# Patient Record
(demographics unavailable — no encounter records)

---

## 2025-02-23 NOTE — PHYSICAL EXAM
[Well Developed] : well developed [Well Nourished] : well nourished [No Acute Distress] : no acute distress [Normal Conjunctiva] : normal conjunctiva [Normal Venous Pressure] : normal venous pressure [Normal S1, S2] : normal S1, S2 [No Rub] : no rub [No Gallop] : no gallop [Clear Lung Fields] : clear lung fields [Good Air Entry] : good air entry [No Respiratory Distress] : no respiratory distress  [Soft] : abdomen soft [Non Tender] : non-tender [No Masses/organomegaly] : no masses/organomegaly [Normal Bowel Sounds] : normal bowel sounds [Normal Gait] : normal gait [No Cyanosis] : no cyanosis [No Clubbing] : no clubbing [No Varicosities] : no varicosities [Normal DP B/L] : normal DP B/L [Edema ___] : edema [unfilled] [Venous stasis] : venous stasis [No Rash] : no rash [No Skin Lesions] : no skin lesions [Moves all extremities] : moves all extremities [No Focal Deficits] : no focal deficits [Normal Speech] : normal speech [Alert and Oriented] : alert and oriented [Normal memory] : normal memory [de-identified] : Right [de-identified] : 2/6 systolic ejection murmur

## 2025-02-23 NOTE — HISTORY OF PRESENT ILLNESS
[FreeTextEntry1] : On December 11 the patient had a left lateral mild chest discomfort which radiated to his upper teeth.  It occurred at rest a few hours after exercise.  He took 3 nitroglycerin.  Pain was a 1-2 out of 10.  Patient is also had heartburn which is a bitter taste in his mouth.  He does calisthenics and walks for 2 hours without difficulty.  He has dyspnea rushing up to help.  He will get lab test tomorrow.

## 2025-02-23 NOTE — DISCUSSION/SUMMARY
[FreeTextEntry1] : The patient has a history of coronary artery disease.  3 months ago he had an episode of chest pain which was atypical/nonanginal.  His EKG is normal.  His exercise tolerance is excellent.  He has not had a recent recurrence.  His last stress test was normal.  The pain was noncardiac.  The blood pressure and cholesterol have been good.  The patient will continue rosuvastatin.  The patient had questions regarding an angiogram but this is not indicated.  He will return for carotid Doppler to evaluate his carotid bruit. [EKG obtained to assist in diagnosis and management of assessed problem(s)] : EKG obtained to assist in diagnosis and management of assessed problem(s)

## 2025-05-11 NOTE — DISCUSSION/SUMMARY
[FreeTextEntry1] : The patient has a history of coronary artery disease.  His atypical chest discomfort.  EKG is normal.  Echocardiogram shows an ejection fraction of 60% without significant valvular disease.  Labs were done recently.  The patient will return for an exercise test.  He will go to the emergency room if he has recurrent pain. [EKG obtained to assist in diagnosis and management of assessed problem(s)] : EKG obtained to assist in diagnosis and management of assessed problem(s)

## 2025-05-11 NOTE — PHYSICAL EXAM
[Well Developed] : well developed [Well Nourished] : well nourished [No Acute Distress] : no acute distress [Normal Conjunctiva] : normal conjunctiva [Normal Venous Pressure] : normal venous pressure [Normal S1, S2] : normal S1, S2 [No Rub] : no rub [No Gallop] : no gallop [Clear Lung Fields] : clear lung fields [Good Air Entry] : good air entry [No Respiratory Distress] : no respiratory distress  [Soft] : abdomen soft [Non Tender] : non-tender [No Masses/organomegaly] : no masses/organomegaly [Normal Bowel Sounds] : normal bowel sounds [Normal Gait] : normal gait [No Cyanosis] : no cyanosis [No Clubbing] : no clubbing [No Varicosities] : no varicosities [Normal DP B/L] : normal DP B/L [Edema ___] : edema [unfilled] [Venous stasis] : venous stasis [No Rash] : no rash [No Skin Lesions] : no skin lesions [Moves all extremities] : moves all extremities [No Focal Deficits] : no focal deficits [Normal Speech] : normal speech [Alert and Oriented] : alert and oriented [Normal memory] : normal memory [de-identified] : Right [de-identified] : 2/6 systolic ejection murmur

## 2025-05-11 NOTE — DISCUSSION/SUMMARY
[EKG obtained to assist in diagnosis and management of assessed problem(s)] : EKG obtained to assist in diagnosis and management of assessed problem(s) [FreeTextEntry1] : The patient has a history of coronary artery disease.  His atypical chest discomfort.  EKG is normal.  Echocardiogram shows an ejection fraction of 60% without significant valvular disease.  Labs were done recently.  The patient will return for an exercise test.  He will go to the emergency room if he has recurrent pain.

## 2025-05-11 NOTE — PHYSICAL EXAM
[Well Developed] : well developed [Well Nourished] : well nourished [No Acute Distress] : no acute distress [Normal Conjunctiva] : normal conjunctiva [Normal Venous Pressure] : normal venous pressure [Normal S1, S2] : normal S1, S2 [No Rub] : no rub [No Gallop] : no gallop [Clear Lung Fields] : clear lung fields [Good Air Entry] : good air entry [No Respiratory Distress] : no respiratory distress  [Soft] : abdomen soft [Non Tender] : non-tender [No Masses/organomegaly] : no masses/organomegaly [Normal Bowel Sounds] : normal bowel sounds [Normal Gait] : normal gait [No Cyanosis] : no cyanosis [No Clubbing] : no clubbing [No Varicosities] : no varicosities [Normal DP B/L] : normal DP B/L [Edema ___] : edema [unfilled] [Venous stasis] : venous stasis [No Rash] : no rash [No Skin Lesions] : no skin lesions [Moves all extremities] : moves all extremities [No Focal Deficits] : no focal deficits [Normal Speech] : normal speech [Alert and Oriented] : alert and oriented [Normal memory] : normal memory [de-identified] : Right [de-identified] : 2/6 systolic ejection murmur

## 2025-05-11 NOTE — HISTORY OF PRESENT ILLNESS
[FreeTextEntry1] : chest pain last 5 days ago, circling, moderate, 10 min, at rest, 3-4x, usual pain. 3-4/10.  walk 40 min, stairs ok.

## 2025-05-11 NOTE — PHYSICAL EXAM
[Well Developed] : well developed [Well Nourished] : well nourished [No Acute Distress] : no acute distress [Normal Conjunctiva] : normal conjunctiva [Normal Venous Pressure] : normal venous pressure [Normal S1, S2] : normal S1, S2 [No Rub] : no rub [No Gallop] : no gallop [Clear Lung Fields] : clear lung fields [Good Air Entry] : good air entry [No Respiratory Distress] : no respiratory distress  [Soft] : abdomen soft [Non Tender] : non-tender [No Masses/organomegaly] : no masses/organomegaly [Normal Bowel Sounds] : normal bowel sounds [Normal Gait] : normal gait [No Cyanosis] : no cyanosis [No Clubbing] : no clubbing [No Varicosities] : no varicosities [Normal DP B/L] : normal DP B/L [Edema ___] : edema [unfilled] [Venous stasis] : venous stasis [No Rash] : no rash [No Skin Lesions] : no skin lesions [Moves all extremities] : moves all extremities [No Focal Deficits] : no focal deficits [Normal Speech] : normal speech [Alert and Oriented] : alert and oriented [Normal memory] : normal memory [de-identified] : Right [de-identified] : 2/6 systolic ejection murmur

## 2025-06-03 NOTE — PHYSICAL EXAM
[Well Developed] : well developed [Well Nourished] : well nourished [No Acute Distress] : no acute distress [Normal Conjunctiva] : normal conjunctiva [Normal Venous Pressure] : normal venous pressure [Normal S1, S2] : normal S1, S2 [No Rub] : no rub [No Gallop] : no gallop [Clear Lung Fields] : clear lung fields [Good Air Entry] : good air entry [No Respiratory Distress] : no respiratory distress  [Soft] : abdomen soft [Non Tender] : non-tender [No Masses/organomegaly] : no masses/organomegaly [Normal Bowel Sounds] : normal bowel sounds [Normal Gait] : normal gait [No Cyanosis] : no cyanosis [No Clubbing] : no clubbing [No Varicosities] : no varicosities [Normal DP B/L] : normal DP B/L [Edema ___] : edema [unfilled] [Venous stasis] : venous stasis [No Rash] : no rash [No Skin Lesions] : no skin lesions [Moves all extremities] : moves all extremities [No Focal Deficits] : no focal deficits [Normal Speech] : normal speech [Alert and Oriented] : alert and oriented [Normal memory] : normal memory [de-identified] : Right [de-identified] : 2/6 systolic ejection murmur

## 2025-06-03 NOTE — PHYSICAL EXAM
[Well Developed] : well developed [Well Nourished] : well nourished [No Acute Distress] : no acute distress [Normal Conjunctiva] : normal conjunctiva [Normal Venous Pressure] : normal venous pressure [Normal S1, S2] : normal S1, S2 [No Rub] : no rub [No Gallop] : no gallop [Clear Lung Fields] : clear lung fields [Good Air Entry] : good air entry [No Respiratory Distress] : no respiratory distress  [Soft] : abdomen soft [Non Tender] : non-tender [No Masses/organomegaly] : no masses/organomegaly [Normal Bowel Sounds] : normal bowel sounds [Normal Gait] : normal gait [No Cyanosis] : no cyanosis [No Clubbing] : no clubbing [No Varicosities] : no varicosities [Normal DP B/L] : normal DP B/L [Edema ___] : edema [unfilled] [Venous stasis] : venous stasis [No Rash] : no rash [No Skin Lesions] : no skin lesions [Moves all extremities] : moves all extremities [No Focal Deficits] : no focal deficits [Normal Speech] : normal speech [Alert and Oriented] : alert and oriented [Normal memory] : normal memory [de-identified] : Right [de-identified] : 2/6 systolic ejection murmur

## 2025-06-03 NOTE — DISCUSSION/SUMMARY
[FreeTextEntry1] : 88 year old Male with PMHx of CAD, HLD, ischemic cardiomyopathy, carotid bruit, CKD, LVH presents today for followup, carotid ultrasound, and stress echocardiogram  Chest pain / CAD / HLD: Chest pain now fully resolved. Stress echocardiogram performed in office today, results pending. Will continue with cardiac risk factor optimization. LDL goal < 70, most recent LDL 33. Labs were completed with his PCP, will reach out to obtain those results. Continue ASA 81 mg daily and Rosuvastatin 20 mg daily. He will continue to monitor his symptoms and notify our office if he has new or worsening chest pain, dyspnea, palpitations, syncope. Ischemic cardiomyopathy: Mild dyspnea on exertion. Echocardiogram in 10/2024 revealed EF 64% without regional wall motion abnormalities. Continue to monitor with annual echocardiogram. Carotid bruit: Carotid ultrasound performed in office today, results pending.   Follow up 4 months with echocardiogram.

## 2025-06-03 NOTE — HISTORY OF PRESENT ILLNESS
[FreeTextEntry1] : 88 year old Male with PMHx of CAD, HLD, ischemic cardiomyopathy, carotid bruit, CKD, LVH presents today for followup, carotid ultrasound, and stress echocardiogram  Last seen by Dr. Flannery on 5/9/2025, at which time he reported an episode of chest pain. Since then he has had no recurrent chest pain and is feeling well overall. He walks 30 minutes most days and occasionally jogs around his apartment. He walked up several flights of subway stairs today, he had mild dyspnea but no exertional chest pain. He also does pushups at home. He had labs completed with his PCP within the past month.   Patient denies any chest pain or shortness of breath at rest, palpitations, orthopnea, PND, dizziness, falls, syncope, other neuro focal deficits, or lower extremity edema. ================================================================= Previous workup: Labs (1/2024): K 4.8 Cr 0.95 RBC 3.68 H/H 10.8/33.7 GFR  TC 92 HDL 45 LDL 33 A1c 5.8%  Echo (5/2025): 1. Left ventricular wall thickness is normal. Left ventricular systolic function is normal with an ejection fraction of 64 % by Vuong's method of disks. There are no regional wall motion abnormalities seen. 2. Normal left ventricular diastolic function, with normal left ventricular filling pressure. 3. Normal right ventricular cavity size, with normal wall thickness, and normal right ventricular systolic function. 4. No significant valvular disease. 5. No pericardial effusion seen.  Stress echo (10/2024): 6 minutes 2 seconds, METS 7.0 1. No chest pain. 2. Positive electrocardiographic changes at or near maximal predicted heart rate. 3. No echocardiographic evidence of exercise induced ischemia.  Carotid US (10/2019): intima-medial thickening of the right common carotid arteries without hemodynamic significance. mild atherosclerotic plaque noted in the right proximal internal carotid arteries indicating 1-15% stenosis bilaterally, vertebral arteries flow are antegrade and within the normal limits